# Patient Record
Sex: FEMALE | Race: WHITE | NOT HISPANIC OR LATINO | URBAN - METROPOLITAN AREA
[De-identification: names, ages, dates, MRNs, and addresses within clinical notes are randomized per-mention and may not be internally consistent; named-entity substitution may affect disease eponyms.]

---

## 2020-02-14 ENCOUNTER — OUTPATIENT (OUTPATIENT)
Dept: OUTPATIENT SERVICES | Facility: HOSPITAL | Age: 60
LOS: 1 days | End: 2020-02-14

## 2020-07-07 ENCOUNTER — OUTPATIENT (OUTPATIENT)
Dept: OUTPATIENT SERVICES | Facility: HOSPITAL | Age: 60
LOS: 1 days | End: 2020-07-07

## 2021-04-21 ENCOUNTER — APPOINTMENT (OUTPATIENT)
Age: 61
End: 2021-04-21

## 2021-09-01 ENCOUNTER — APPOINTMENT (OUTPATIENT)
Dept: OPHTHALMOLOGY | Facility: CLINIC | Age: 61
End: 2021-09-01
Payer: MEDICARE

## 2021-09-01 ENCOUNTER — NON-APPOINTMENT (OUTPATIENT)
Age: 61
End: 2021-09-01

## 2021-09-01 PROCEDURE — 92014 COMPRE OPH EXAM EST PT 1/>: CPT

## 2021-09-02 ENCOUNTER — RESULT REVIEW (OUTPATIENT)
Age: 61
End: 2021-09-02

## 2021-10-15 PROBLEM — Z00.00 ENCOUNTER FOR PREVENTIVE HEALTH EXAMINATION: Status: ACTIVE | Noted: 2021-10-15

## 2021-12-06 ENCOUNTER — TRANSCRIPTION ENCOUNTER (OUTPATIENT)
Age: 61
End: 2021-12-06

## 2021-12-06 ENCOUNTER — OUTPATIENT (OUTPATIENT)
Dept: OUTPATIENT SERVICES | Facility: HOSPITAL | Age: 61
LOS: 1 days | End: 2021-12-06

## 2022-05-04 VITALS — BODY MASS INDEX: 31.89 KG/M2 | WEIGHT: 180 LBS | HEIGHT: 63 IN

## 2022-05-05 ENCOUNTER — APPOINTMENT (OUTPATIENT)
Dept: ORTHOPEDIC SURGERY | Facility: CLINIC | Age: 62
End: 2022-05-05
Payer: OTHER MISCELLANEOUS

## 2022-05-05 VITALS — HEIGHT: 63 IN | WEIGHT: 180 LBS | BODY MASS INDEX: 31.89 KG/M2

## 2022-05-05 DIAGNOSIS — Z87.891 PERSONAL HISTORY OF NICOTINE DEPENDENCE: ICD-10-CM

## 2022-05-05 DIAGNOSIS — Z86.59 PERSONAL HISTORY OF OTHER MENTAL AND BEHAVIORAL DISORDERS: ICD-10-CM

## 2022-05-05 DIAGNOSIS — Z78.9 OTHER SPECIFIED HEALTH STATUS: ICD-10-CM

## 2022-05-05 DIAGNOSIS — S92.424D NONDISPLACED FRACTURE OF DISTAL PHALANX OF RIGHT GREAT TOE, SUBSEQUENT ENCOUNTER FOR FRACTURE WITH ROUTINE HEALING: ICD-10-CM

## 2022-05-05 DIAGNOSIS — M77.8 OTHER ENTHESOPATHIES, NOT ELSEWHERE CLASSIFIED: ICD-10-CM

## 2022-05-05 DIAGNOSIS — S92.423A DISPLACED FRACTURE OF DISTAL PHALANX OF UNSPECIFIED GREAT TOE, INITIAL ENCOUNTER FOR CLOSED FRACTURE: ICD-10-CM

## 2022-05-05 DIAGNOSIS — Z82.49 FAMILY HISTORY OF ISCHEMIC HEART DISEASE AND OTHER DISEASES OF THE CIRCULATORY SYSTEM: ICD-10-CM

## 2022-05-05 PROCEDURE — 20610 DRAIN/INJ JOINT/BURSA W/O US: CPT | Mod: LT

## 2022-05-05 PROCEDURE — 99215 OFFICE O/P EST HI 40 MIN: CPT | Mod: 25

## 2022-05-05 PROCEDURE — 99072 ADDL SUPL MATRL&STAF TM PHE: CPT

## 2022-05-05 RX ORDER — BUPROPION HYDROCHLORIDE 300 MG/1
300 TABLET, EXTENDED RELEASE ORAL
Refills: 0 | Status: ACTIVE | COMMUNITY

## 2022-05-05 RX ORDER — HYDROMORPHONE HYDROCHLORIDE 2 MG/1
2 TABLET ORAL
Refills: 0 | Status: ACTIVE | COMMUNITY

## 2022-05-05 RX ORDER — DIAZEPAM 2 MG/1
2 TABLET ORAL
Refills: 0 | Status: ACTIVE | COMMUNITY

## 2022-05-05 RX ORDER — DICLOFENAC SODIUM 1 %
1 KIT TOPICAL
Refills: 0 | Status: ACTIVE | COMMUNITY

## 2022-05-05 RX ORDER — METHYLPREDNISOLONE 4 MG/1
4 TABLET ORAL
Refills: 0 | Status: ACTIVE | COMMUNITY

## 2022-05-05 RX ORDER — LEVOCETIRIZINE DIHYDROCHLORIDE 5 MG/1
5 TABLET ORAL
Refills: 0 | Status: ACTIVE | COMMUNITY

## 2022-05-05 RX ORDER — IBUPROFEN 800 MG/1
800 TABLET, FILM COATED ORAL
Refills: 0 | Status: ACTIVE | COMMUNITY

## 2022-05-05 RX ORDER — FLUTICASONE PROPIONATE 50 UG/1
50 SPRAY, METERED NASAL
Refills: 0 | Status: ACTIVE | COMMUNITY

## 2022-05-05 RX ORDER — CYCLOBENZAPRINE HYDROCHLORIDE 10 MG/1
10 TABLET, FILM COATED ORAL
Refills: 0 | Status: ACTIVE | COMMUNITY

## 2022-05-05 RX ORDER — OXYCODONE AND ASPIRIN 4.5; .38; 325 MG/1; MG/1; MG/1
4.835-325 TABLET ORAL
Refills: 0 | Status: ACTIVE | COMMUNITY

## 2022-05-05 RX ORDER — OXYCODONE AND ACETAMINOPHEN 10; 325 MG/1; MG/1
10-325 TABLET ORAL
Refills: 0 | Status: ACTIVE | COMMUNITY

## 2022-05-05 RX ORDER — OMEPRAZOLE 40 MG/1
40 CAPSULE, DELAYED RELEASE ORAL
Refills: 0 | Status: ACTIVE | COMMUNITY

## 2022-05-05 NOTE — PROCEDURE
[Large Joint Injection] : Large joint injection [Left] : of the left [Greater Trochanteric Bursa] : greater trochanteric bursa [Pain] : pain [Inflammation] : inflammation [Betadine] : betadine [___ cc    1%] : Lidocaine ~Vcc of 1%  [___ cc    40mg] : Triamcinolone (Kenalog) ~Vcc of 40 mg  [] : Patient tolerated procedure well [Call if redness, pain or fever occur] : call if redness, pain or fever occur [Apply ice for 15min out of every hour for the next 12-24 hours as tolerated] : apply ice for 15 minutes out of every hour for the next 12-24 hours as tolerated [Patient was advised to rest the joint(s) for ____ days] : patient was advised to rest the joint(s) for [unfilled] days [Previous OTC use and PT nontherapeutic] : patient has tried OTC's including aspirin, Ibuprofen, Aleve, etc or prescription NSAIDS, and/or exercises at home and/or physical therapy without satisfactory response [Risks, benefits, alternatives discussed / Verbal consent obtained] : the risks benefits, and alternatives have been discussed, and verbal consent was obtained

## 2022-05-05 NOTE — ASSESSMENT
[FreeTextEntry1] : Worker's compensation 100% temporarily disabled \par \par Recommend: - NSAID - Heating pad - Muscle relaxer - Neck stretching exercise - Soft cervical collar - Cervical traction Patient is given neck rehabilitation exercise book.\par \par Recommend: - NSAID - Heating pad - Muscle relaxer - Core strengthening exercise - Hamstring stretching exercise Patient is given back rehabilitation exercise book.\par \par Recommend: - rest - ice - compression - elevation \par \par Continue HEP\par \par Follow up in 2 months

## 2022-05-05 NOTE — HISTORY OF PRESENT ILLNESS
[de-identified] : Follow up neck and back. Had Right RFA for C spine  on 4/11/22. Has had fewer headaches. Back pain is constant. Taking Percocet, Oxycodone, Cyclobenzaprine, Ibuprofen, and Diazepam.
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
## 2022-07-11 ENCOUNTER — OUTPATIENT (OUTPATIENT)
Dept: OUTPATIENT SERVICES | Facility: HOSPITAL | Age: 62
LOS: 1 days | End: 2022-07-11

## 2022-07-11 DIAGNOSIS — Z01.810 ENCOUNTER FOR PREPROCEDURAL CARDIOVASCULAR EXAMINATION: ICD-10-CM

## 2022-07-11 DIAGNOSIS — Z01.812 ENCOUNTER FOR PREPROCEDURAL LABORATORY EXAMINATION: ICD-10-CM

## 2022-07-11 DIAGNOSIS — K43.6 OTHER AND UNSPECIFIED VENTRAL HERNIA WITH OBSTRUCTION, WITHOUT GANGRENE: ICD-10-CM

## 2022-07-11 PROCEDURE — 93010 ELECTROCARDIOGRAM REPORT: CPT

## 2022-07-14 ENCOUNTER — APPOINTMENT (OUTPATIENT)
Dept: ORTHOPEDIC SURGERY | Facility: CLINIC | Age: 62
End: 2022-07-14

## 2022-07-14 PROCEDURE — 99072 ADDL SUPL MATRL&STAF TM PHE: CPT

## 2022-07-14 PROCEDURE — 99215 OFFICE O/P EST HI 40 MIN: CPT

## 2022-07-14 RX ORDER — LIDOCAINE 5% 700 MG/1
5 PATCH TOPICAL
Qty: 90 | Refills: 0 | Status: ACTIVE | COMMUNITY
Start: 2022-07-11

## 2022-07-14 RX ORDER — NALOXEGOL OXALATE 12.5 MG/1
12.5 TABLET, FILM COATED ORAL
Qty: 30 | Refills: 0 | Status: ACTIVE | COMMUNITY
Start: 2022-07-12

## 2022-07-14 RX ORDER — TIZANIDINE 4 MG/1
4 TABLET ORAL
Qty: 360 | Refills: 0 | Status: ACTIVE | COMMUNITY
Start: 2022-05-12

## 2022-07-14 RX ORDER — FAMOTIDINE 40 MG/1
40 TABLET, FILM COATED ORAL
Qty: 180 | Refills: 0 | Status: ACTIVE | COMMUNITY
Start: 2021-07-15

## 2022-07-14 NOTE — HISTORY OF PRESENT ILLNESS
[de-identified] : Follow up neck and back. \par Patient reports increase in pain since last viist- reports pain in right side of lower back and frequent headaches\par Reports Taking Percocet, Cyclobenzaprine, Ibuprofen, and Diazepam.\par Pt had neck RFA x3 months ago-states can not have treatment to the neck x3 more months \par \par Pt is scheduled for hernia repair sx on Monday \par
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
## 2022-07-14 NOTE — ASSESSMENT
[FreeTextEntry1] : Patient is scheduled for hernia repair this Monday, recommend continuing HEP until patient is cleared by her surgeon. \par \par Worker's compensation 100% temporarily disabled \par \par Recommend: - NSAID - Heating pad - Muscle relaxer - Neck stretching exercise - Soft cervical collar - Cervical traction Patient is given neck rehabilitation exercise book.\par \par Recommend: - NSAID - Heating pad - Muscle relaxer - Core strengthening exercise - Hamstring stretching exercise Patient is given back rehabilitation exercise book.\par \par Recommend: - rest - ice - compression - elevation \par \par Follow up in 2 months

## 2022-07-14 NOTE — WORK
[Total] : total [Bending/Twisting] : bending/twisting [Lifting] : lifting [Reaching overhead] : reaching overhead

## 2022-07-18 ENCOUNTER — OUTPATIENT (OUTPATIENT)
Dept: OUTPATIENT SERVICES | Facility: HOSPITAL | Age: 62
LOS: 1 days | End: 2022-07-18
Payer: MEDICARE

## 2022-07-28 DIAGNOSIS — K42.0 UMBILICAL HERNIA WITH OBSTRUCTION, WITHOUT GANGRENE: ICD-10-CM

## 2022-07-28 DIAGNOSIS — K43.6 OTHER AND UNSPECIFIED VENTRAL HERNIA WITH OBSTRUCTION, WITHOUT GANGRENE: ICD-10-CM

## 2022-07-28 DIAGNOSIS — F41.9 ANXIETY DISORDER, UNSPECIFIED: ICD-10-CM

## 2022-07-28 DIAGNOSIS — K44.9 DIAPHRAGMATIC HERNIA WITHOUT OBSTRUCTION OR GANGRENE: ICD-10-CM

## 2022-07-28 DIAGNOSIS — F32.9 MAJOR DEPRESSIVE DISORDER, SINGLE EPISODE, UNSPECIFIED: ICD-10-CM

## 2022-08-11 ENCOUNTER — APPOINTMENT (OUTPATIENT)
Dept: ORTHOPEDIC SURGERY | Facility: CLINIC | Age: 62
End: 2022-08-11

## 2022-08-11 VITALS — WEIGHT: 180 LBS | BODY MASS INDEX: 31.89 KG/M2 | HEIGHT: 63 IN

## 2022-08-11 PROCEDURE — 99214 OFFICE O/P EST MOD 30 MIN: CPT | Mod: 25

## 2022-08-11 PROCEDURE — 20610 DRAIN/INJ JOINT/BURSA W/O US: CPT | Mod: RT

## 2022-08-11 NOTE — PHYSICAL EXAM
[Bilateral] : hip bilaterally [] : greater trochanteric tenderness [5___] : adduction 5[unfilled]/5 [2+] : posterior tibialis pulse: 2+ [de-identified] : external rotation 40 degrees [TWNoteComboBox6] : internal rotation 30 degrees

## 2022-08-11 NOTE — ASSESSMENT
[FreeTextEntry1] : Recommend: - rest - ice - compression - elevation \par \par Follow up in 2 weeks
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
## 2022-08-26 ENCOUNTER — APPOINTMENT (OUTPATIENT)
Dept: ORTHOPEDIC SURGERY | Facility: CLINIC | Age: 62
End: 2022-08-26

## 2022-08-26 VITALS — WEIGHT: 180 LBS | BODY MASS INDEX: 31.89 KG/M2 | HEIGHT: 63 IN

## 2022-08-26 PROCEDURE — 99214 OFFICE O/P EST MOD 30 MIN: CPT | Mod: 25

## 2022-08-26 PROCEDURE — 20610 DRAIN/INJ JOINT/BURSA W/O US: CPT | Mod: LT

## 2022-08-26 NOTE — HISTORY OF PRESENT ILLNESS
[Sudden] : sudden [10] : 10 [Sharp] : sharp [Intermittent] : intermittent [Nothing helps with pain getting better] : Nothing helps with pain getting better [de-identified] : Follow up bilateral hips. Feels relief from right hip CSI. Pain is 8/10. Experiencing pain when sleeping and laying on her sides. Taking Percocet, Cyclobenzaprine, Ibuprofen, and Diazepam. Here for left hip CSI. [] : no [FreeTextEntry1] : bilateral hips [FreeTextEntry7] : down right leg [de-identified] : movement

## 2022-08-26 NOTE — PHYSICAL EXAM
[Bilateral] : hip bilaterally [5___] : adduction 5[unfilled]/5 [2+] : posterior tibialis pulse: 2+ [] : no palpable masses [de-identified] : external rotation 40 degrees [TWNoteComboBox6] : internal rotation 30 degrees

## 2022-08-26 NOTE — PROCEDURE
[Large Joint Injection] : Large joint injection [Left] : of the left [Greater Trochanteric Bursa] : greater trochanteric bursa [Pain] : pain [Inflammation] : inflammation [Betadine] : betadine [Ethyl Chloride sprayed topically] : ethyl chloride sprayed topically [Sterile technique used] : sterile technique used [___ cc    1%] : Lidocaine ~Vcc of 1%  [___ cc    40mg] : Triamcinolone (Kenalog) ~Vcc of 40 mg  [] : Patient tolerated procedure well [Call if redness, pain or fever occur] : call if redness, pain or fever occur [Apply ice for 15min out of every hour for the next 12-24 hours as tolerated] : apply ice for 15 minutes out of every hour for the next 12-24 hours as tolerated [Patient was advised to rest the joint(s) for ____ days] : patient was advised to rest the joint(s) for [unfilled] days [Previous OTC use and PT nontherapeutic] : patient has tried OTC's including aspirin, Ibuprofen, Aleve, etc or prescription NSAIDS, and/or exercises at home and/or physical therapy without satisfactory response [Patient had decreased mobility in the joint] : patient had decreased mobility in the joint [Risks, benefits, alternatives discussed / Verbal consent obtained] : the risks benefits, and alternatives have been discussed, and verbal consent was obtained

## 2022-09-15 ENCOUNTER — APPOINTMENT (OUTPATIENT)
Dept: ORTHOPEDIC SURGERY | Facility: CLINIC | Age: 62
End: 2022-09-15

## 2022-09-15 VITALS — BODY MASS INDEX: 31.89 KG/M2 | HEIGHT: 63 IN | WEIGHT: 180 LBS

## 2022-09-15 PROCEDURE — 99215 OFFICE O/P EST HI 40 MIN: CPT

## 2022-09-15 PROCEDURE — 99072 ADDL SUPL MATRL&STAF TM PHE: CPT

## 2022-09-15 NOTE — ASSESSMENT
[FreeTextEntry1] : Worker's compensation 100% temporarily disabled \par \par Recommend: - NSAID - Heating pad - Muscle relaxer - Neck stretching exercise - Soft cervical collar - Cervical traction Patient is given neck rehabilitation exercise book.\par \par Recommend: - NSAID - Heating pad - Muscle relaxer - Core strengthening exercise - Hamstring stretching exercise Patient is given back rehabilitation exercise book.\par \par Recommend: - rest - ice - compression - elevation \par \par Patient will begin physical therapy. \par \par Follow up in 2 months 
[FreeTextEntry1] : Worker's compensation 100% temporarily disabled \par \par Recommend: - NSAID - Heating pad - Muscle relaxer - Neck stretching exercise - Soft cervical collar - Cervical traction Patient is given neck rehabilitation exercise book.\par \par Recommend: - NSAID - Heating pad - Muscle relaxer - Core strengthening exercise - Hamstring stretching exercise Patient is given back rehabilitation exercise book.\par \par Recommend: - rest - ice - compression - elevation \par \par Patient will begin physical therapy. \par \par Follow up in 2 months 
independent

## 2022-09-15 NOTE — HISTORY OF PRESENT ILLNESS
[de-identified] : Follow up cervical and lumbar spine. Experiencing intermittent headaches. Experiencing an increase in pain in lower back radiating down right leg to the foot. Taking Percocet, Cyclobenzaprine, Ibuprofen, and Diazepam.\par

## 2022-09-15 NOTE — HISTORY OF PRESENT ILLNESS
[de-identified] : Follow up cervical and lumbar spine. Experiencing intermittent headaches. Experiencing an increase in pain in lower back radiating down right leg to the foot. Taking Percocet, Cyclobenzaprine, Ibuprofen, and Diazepam.\par

## 2022-09-21 ENCOUNTER — FORM ENCOUNTER (OUTPATIENT)
Age: 62
End: 2022-09-21

## 2022-11-16 ENCOUNTER — APPOINTMENT (OUTPATIENT)
Dept: OPHTHALMOLOGY | Facility: CLINIC | Age: 62
End: 2022-11-16

## 2022-11-16 ENCOUNTER — NON-APPOINTMENT (OUTPATIENT)
Age: 62
End: 2022-11-16

## 2022-11-16 PROCEDURE — 92014 COMPRE OPH EXAM EST PT 1/>: CPT

## 2022-11-17 ENCOUNTER — APPOINTMENT (OUTPATIENT)
Dept: ORTHOPEDIC SURGERY | Facility: CLINIC | Age: 62
End: 2022-11-17

## 2022-11-17 VITALS — HEIGHT: 63 IN | BODY MASS INDEX: 31.89 KG/M2 | WEIGHT: 180 LBS

## 2022-11-17 PROCEDURE — 99072 ADDL SUPL MATRL&STAF TM PHE: CPT

## 2022-11-17 PROCEDURE — 99215 OFFICE O/P EST HI 40 MIN: CPT

## 2022-11-17 NOTE — ASSESSMENT
[FreeTextEntry1] : Worker's compensation 100% temporarily disabled \par \par Recommend: - NSAID - Heating pad - Muscle relaxer - Neck stretching exercise - Soft cervical collar - Cervical traction Patient is given neck rehabilitation exercise book.\par \par Recommend: - NSAID - Heating pad - Muscle relaxer - Core strengthening exercise - Hamstring stretching exercise Patient is given back rehabilitation exercise book.\par \par Recommend: - rest - ice - compression - elevation \par \par Patient will continue physical therapy. \par \par Patient given prescription for MRI, follow up after study is completed to discuss results.\par \par Follow up in 2 months

## 2022-11-17 NOTE — HISTORY OF PRESENT ILLNESS
[de-identified] : Follow up cervical and lumbar spine. Experiencing "locking" in the neck when turning the head to the side. Has had a headache for 2 weeks. Experiencing a flare up in her neck and lower back down her right leg to her foot. WC has denied PT. Taking Percocet, Cyclobenzaprine, Ibuprofen, and Diazepam.\par

## 2022-11-20 ENCOUNTER — FORM ENCOUNTER (OUTPATIENT)
Age: 62
End: 2022-11-20

## 2022-12-01 ENCOUNTER — FORM ENCOUNTER (OUTPATIENT)
Age: 62
End: 2022-12-01

## 2022-12-07 ENCOUNTER — RESULT REVIEW (OUTPATIENT)
Age: 62
End: 2022-12-07

## 2023-01-26 ENCOUNTER — APPOINTMENT (OUTPATIENT)
Dept: ORTHOPEDIC SURGERY | Facility: CLINIC | Age: 63
End: 2023-01-26
Payer: OTHER MISCELLANEOUS

## 2023-01-26 VITALS — BODY MASS INDEX: 31.89 KG/M2 | HEIGHT: 63 IN | WEIGHT: 180 LBS

## 2023-01-26 PROCEDURE — 99215 OFFICE O/P EST HI 40 MIN: CPT

## 2023-01-26 PROCEDURE — 99072 ADDL SUPL MATRL&STAF TM PHE: CPT

## 2023-01-26 NOTE — ASSESSMENT
[FreeTextEntry1] : Stenosis C3-5, progressed since last MRI in 2021\par DDD C3-7\par \par Worker's compensation 100% temporarily disabled \par \par Recommend: - NSAID - Heating pad - Muscle relaxer - Neck stretching exercise - Soft cervical collar - Cervical traction Patient is given neck rehabilitation exercise book.\par \par Recommend: - NSAID - Heating pad - Muscle relaxer - Core strengthening exercise - Hamstring stretching exercise Patient is given back rehabilitation exercise book.\par \par Recommend: - rest - ice - compression - elevation \par \par Patient will continue physical therapy. \par \par Referral to pain management for injections, follow up 2 weeks after injection. \par \par Follow up in 2 months

## 2023-01-26 NOTE — DATA REVIEWED
[MRI] : MRI [Cervical Spine] : cervical spine [Report was reviewed and noted in the chart] : The report was reviewed and noted in the chart [I independently reviewed and interpreted images and report] : I independently reviewed and interpreted images and report [FreeTextEntry1] : Stenosis C3-5, progressed since last MRI in 2021\par DDD C3-7

## 2023-01-26 NOTE — HISTORY OF PRESENT ILLNESS
[de-identified] : Follow up cervical spine MRI Results at [facility]. Experiencing constant pain and tightness. WC has denied PT. Taking Percocet, Cyclobenzaprine, Ibuprofen, and Diazepam.\par

## 2023-02-05 ENCOUNTER — FORM ENCOUNTER (OUTPATIENT)
Age: 63
End: 2023-02-05

## 2023-03-23 ENCOUNTER — APPOINTMENT (OUTPATIENT)
Dept: ORTHOPEDIC SURGERY | Facility: CLINIC | Age: 63
End: 2023-03-23
Payer: OTHER MISCELLANEOUS

## 2023-03-23 VITALS — BODY MASS INDEX: 31.89 KG/M2 | WEIGHT: 180 LBS | HEIGHT: 63 IN

## 2023-03-23 PROCEDURE — 99215 OFFICE O/P EST HI 40 MIN: CPT | Mod: ACP

## 2023-03-23 NOTE — HISTORY OF PRESENT ILLNESS
[de-identified] : Follow up cervical spine. States her pain is radiating into the left shoulder into the bicep, has numbness in fingers. WC has denied PT. Admits to taking Percocet, Cyclobenzaprine, Ibuprofen, and Diazepam for pain.\par

## 2023-03-23 NOTE — ASSESSMENT
[FreeTextEntry1] : Stenosis C3-5, progressed since last MRI in 2021\par DDD C3-7\par \par Worker's compensation 100% temporarily disabled \par \par Recommend: - NSAID - Heating pad - Muscle relaxer - Neck stretching exercise - Soft cervical collar - Cervical traction Patient is given neck rehabilitation exercise book.\par \par Recommend: - NSAID - Heating pad - Muscle relaxer - Core strengthening exercise - Hamstring stretching exercise Patient is given back rehabilitation exercise book.\par \par Recommend: - rest - ice - compression - elevation \par \par Patient will continue physical therapy. \par \par Patient to use Voltaren.\par \par Referred to neurology for migraines.\par \par Follow up in 2 months

## 2023-03-23 NOTE — WORK
[Bending/Twisting] : bending/twisting [Lifting] : lifting [Reaching overhead] : reaching overhead [Total (100%)] : total (100%)

## 2023-05-11 ENCOUNTER — FORM ENCOUNTER (OUTPATIENT)
Age: 63
End: 2023-05-11

## 2023-05-12 ENCOUNTER — RX RENEWAL (OUTPATIENT)
Age: 63
End: 2023-05-12

## 2023-05-25 ENCOUNTER — APPOINTMENT (OUTPATIENT)
Dept: ORTHOPEDIC SURGERY | Facility: CLINIC | Age: 63
End: 2023-05-25
Payer: OTHER MISCELLANEOUS

## 2023-05-25 VITALS — BODY MASS INDEX: 31.89 KG/M2 | HEIGHT: 63 IN | WEIGHT: 180 LBS

## 2023-05-25 PROCEDURE — 99215 OFFICE O/P EST HI 40 MIN: CPT

## 2023-05-25 NOTE — WORK
[Total (100%)] : total (100%) [Bending/Twisting] : bending/twisting [Lifting] : lifting [Reaching overhead] : reaching overhead

## 2023-05-26 NOTE — HISTORY OF PRESENT ILLNESS
[de-identified] : Follow up cervical and lumbar spine. Had cervical ablation ~3 weeks ago with 0% relief. States her neck feels "weird" ever since ablation. States her lower back has constant pain. Admits to taking Percocet, Cyclobenzaprine, Ibuprofen, and Diazepam for pain.\par

## 2023-05-26 NOTE — ASSESSMENT
[FreeTextEntry1] : \par \par Stenosis C3-5, progressed since last MRI in 2021\par DDD C3-7\par \par Patient with persistent symptoms refractory to non operative care. Patient is seeking surgical options. Patient is a candidate for surgery after failing extensive non operative care.\par \par Worker's compensation 100% temporarily disabled \par \par Recommend: - NSAID - Heating pad - Muscle relaxer - Neck stretching exercise - Soft cervical collar - Cervical traction Patient is given neck rehabilitation exercise book.\par \par Recommend: - NSAID - Heating pad - Muscle relaxer - Core strengthening exercise - Hamstring stretching exercise Patient is given back rehabilitation exercise book.\par \par Recommend: - rest - ice - compression - elevation \par \par Patient will continue physical therapy. \par \par Patient to use Voltaren.\par \par Referred to neurology for migraines.\par \par Follow up in 2 months

## 2023-05-26 NOTE — DISCUSSION/SUMMARY
[Surgical risks reviewed] : Surgical risks reviewed [de-identified] : I discussed non operative and operative treatment options in great detail with the patient. I discussed treatment options, including but not limited to,\par 1. Non operative treatment - rest, NSAID, physical therapy etc.\par 2. Interventional treatment - injections etc.\par 3. Surgical treatment -  ACDF C3-4, 4-5, 5-6, autograft, allograft. \par \par Patient wants to proceed with surgical intervention after failing nonoperative care. I had a long discussion with the patient about the rational and goal of the surgery as well as expected outcome. I explained postoperative rehabilitation and recovery process to the patient. I discussed risks, benefits and alternatives of the procedure in detail with the patient. I counseled patient about risks and possible complications, including but not limited to, infection, bleeding, nerve injury, arterial and venous injury, single or multiple muscle group weakness, dural tear, CSF leak, pseudomeningocele, arachnoiditis, CSF fistula, meningitis, discitis, osteomyelitis, esophageal tear, dysphagia, hoarseness, Wolf's syndrome, thoracic duct injury, epidural hematoma, DVT, PE, CRPS, MI, paralysis, pseudoarthrosis, instrumentation malposition, adjacent segment disease and risks of anesthesia. I explained to the patient that the surgical outcome is unpredictable and there is no guarantee that the symptoms will resolve after the surgery. The patient understands and wishes to proceed. All questions were answered and patient was given information to review.\par

## 2023-06-06 ENCOUNTER — FORM ENCOUNTER (OUTPATIENT)
Age: 63
End: 2023-06-06

## 2023-06-08 ENCOUNTER — APPOINTMENT (OUTPATIENT)
Dept: ORTHOPEDIC SURGERY | Facility: CLINIC | Age: 63
End: 2023-06-08
Payer: MEDICARE

## 2023-06-08 VITALS — BODY MASS INDEX: 31.89 KG/M2 | WEIGHT: 180 LBS | HEIGHT: 63 IN

## 2023-06-08 PROCEDURE — 73503 X-RAY EXAM HIP UNI 4/> VIEWS: CPT | Mod: FY,RT

## 2023-06-08 PROCEDURE — 99214 OFFICE O/P EST MOD 30 MIN: CPT | Mod: 25

## 2023-06-08 PROCEDURE — 20610 DRAIN/INJ JOINT/BURSA W/O US: CPT | Mod: RT

## 2023-06-08 NOTE — PROCEDURE
[Large Joint Injection] : Large joint injection [Right] : of the right [Greater Trochanteric Bursa] : greater trochanteric bursa [Pain] : pain [Inflammation] : inflammation [Betadine] : betadine [Ethyl Chloride sprayed topically] : ethyl chloride sprayed topically [Sterile technique used] : sterile technique used [___ cc    1%] : Lidocaine ~Vcc of 1%  [___ cc    40mg] : Triamcinolone (Kenalog) ~Vcc of 40 mg  [] : Patient tolerated procedure well [Call if redness, pain or fever occur] : call if redness, pain or fever occur [Apply ice for 15min out of every hour for the next 12-24 hours as tolerated] : apply ice for 15 minutes out of every hour for the next 12-24 hours as tolerated [Patient was advised to rest the joint(s) for ____ days] : patient was advised to rest the joint(s) for [unfilled] days [Previous OTC use and PT nontherapeutic] : patient has tried OTC's including aspirin, Ibuprofen, Aleve, etc or prescription NSAIDS, and/or exercises at home and/or physical therapy without satisfactory response [Patient had decreased mobility in the joint] : patient had decreased mobility in the joint [Risks, benefits, alternatives discussed / Verbal consent obtained] : the risks benefits, and alternatives have been discussed, and verbal consent was obtained

## 2023-06-08 NOTE — PHYSICAL EXAM
[Bilateral] : hip bilaterally [5___] : adduction 5[unfilled]/5 [2+] : posterior tibialis pulse: 2+ [] : no palpable masses [de-identified] : external rotation 40 degrees [TWNoteComboBox6] : internal rotation 30 degrees

## 2023-06-08 NOTE — IMAGING
[Bilateral] : hip with pelvis bilaterally [AP] : anteroposterior [Lateral] : lateral [Mild arthritis (Tonnis Grade 1)] : Mild arthritis (Tonnis Grade 1)

## 2023-06-08 NOTE — HISTORY OF PRESENT ILLNESS
[Sudden] : sudden [10] : 10 [Sharp] : sharp [Intermittent] : intermittent [Nothing helps with pain getting better] : Nothing helps with pain getting better [de-identified] : Follow up right hip. States the pain wakes her up at night. Admits to taking Percocet, Cyclobenzaprine, Ibuprofen, and Diazepam. States she is here for a right hip CSI. [] : no [FreeTextEntry1] : bilateral hips [FreeTextEntry7] : down right leg [de-identified] : movement

## 2023-07-05 ENCOUNTER — RX RENEWAL (OUTPATIENT)
Age: 63
End: 2023-07-05

## 2023-07-05 RX ORDER — DICLOFENAC SODIUM 1% 10 MG/G
1 GEL TOPICAL 4 TIMES DAILY
Qty: 100 | Refills: 1 | Status: ACTIVE | COMMUNITY
Start: 2023-03-23 | End: 1900-01-01

## 2023-07-10 ENCOUNTER — FORM ENCOUNTER (OUTPATIENT)
Age: 63
End: 2023-07-10

## 2023-07-27 ENCOUNTER — APPOINTMENT (OUTPATIENT)
Dept: ORTHOPEDIC SURGERY | Facility: CLINIC | Age: 63
End: 2023-07-27
Payer: OTHER MISCELLANEOUS

## 2023-07-27 VITALS — BODY MASS INDEX: 31.89 KG/M2 | WEIGHT: 180 LBS | HEIGHT: 63 IN

## 2023-07-27 DIAGNOSIS — M51.36 OTHER INTERVERTEBRAL DISC DEGENERATION, LUMBAR REGION: ICD-10-CM

## 2023-07-27 PROCEDURE — 20610 DRAIN/INJ JOINT/BURSA W/O US: CPT | Mod: LT

## 2023-07-27 PROCEDURE — 99215 OFFICE O/P EST HI 40 MIN: CPT | Mod: 25

## 2023-07-27 NOTE — ASSESSMENT
[FreeTextEntry1] : CT Cervical:\par Spondylolisthesis C3-4 \par DDD C3-6 \par \par MRI cervical:\par Stenosis C3-5, progressed since last MRI in 2021\par DDD C3-7\par \par Referral to pain management for injections, follow up 2 weeks after injection. \par \par Worker's compensation 100% temporarily disabled \par \par Recommend: - NSAID - Heating pad - Muscle relaxer - Neck stretching exercise - Soft cervical collar - Cervical traction Patient is given neck rehabilitation exercise book.\par \par Recommend: - NSAID - Heating pad - Muscle relaxer - Core strengthening exercise - Hamstring stretching exercise Patient is given back rehabilitation exercise book.\par \par Recommend: - rest - ice - compression - elevation \par \par Continue HEP\par \par Follow up in 2 months

## 2023-07-27 NOTE — PROCEDURE
[Large Joint Injection] : Large joint injection [Left] : of the left [Subacromial Space] : subacromial space [Pain] : pain [Inflammation] : inflammation [X-ray evidence of Osteoarthritis on this or prior visit] : x-ray evidence of Osteoarthritis on this or prior visit [Betadine] : betadine [Ethyl Chloride sprayed topically] : ethyl chloride sprayed topically [Sterile technique used] : sterile technique used [___ cc    1%] : Lidocaine ~Vcc of 1%  [___ cc    40mg] : Triamcinolone (Kenalog) ~Vcc of 40 mg  [] : Patient tolerated procedure well [Call if redness, pain or fever occur] : call if redness, pain or fever occur [Apply ice for 15min out of every hour for the next 12-24 hours as tolerated] : apply ice for 15 minutes out of every hour for the next 12-24 hours as tolerated [Patient was advised to rest the joint(s) for ____ days] : patient was advised to rest the joint(s) for [unfilled] days [Previous OTC use and PT nontherapeutic] : patient has tried OTC's including aspirin, Ibuprofen, Aleve, etc or prescription NSAIDS, and/or exercises at home and/or physical therapy without satisfactory response [Patient had decreased mobility in the joint] : patient had decreased mobility in the joint [Risks, benefits, alternatives discussed / Verbal consent obtained] : the risks benefits, and alternatives have been discussed, and verbal consent was obtained

## 2023-07-27 NOTE — DATA REVIEWED
[CT Scan] : CT scan [Cervical Spine] : cervical spine [Report was reviewed and noted in the chart] : The report was reviewed and noted in the chart [I independently reviewed and interpreted images and report] : I independently reviewed and interpreted images and report [FreeTextEntry1] : Spondylolisthesis C3-4 \par DDD C3-6

## 2023-07-27 NOTE — HISTORY OF PRESENT ILLNESS
[de-identified] : Follow up cervical and lumbar spine.  denied PT. Patient states she saw Dr. Bhagat for a second opinion for her neck and was send for a cervical spine CT. Patient states she has right sided lower back pain. Patient states her right leg has been giving out. Patient admits to taking Percocet, Tizanidine, Ibuprofen, and Diazepam for pain. Patient had cervical spine CT at .\par

## 2023-07-27 NOTE — HISTORY OF PRESENT ILLNESS
[de-identified] : Follow up cervical and lumbar spine.  denied PT. Patient states she saw Dr. Bhagat for a second opinion for her neck and was send for a cervical spine CT. Patient states she has right sided lower back pain. Patient states her right leg has been giving out. Patient admits to taking Percocet, Tizanidine, Ibuprofen, and Diazepam for pain. Patient had cervical spine CT at .\par

## 2023-08-10 ENCOUNTER — APPOINTMENT (OUTPATIENT)
Dept: ORTHOPEDIC SURGERY | Facility: CLINIC | Age: 63
End: 2023-08-10

## 2023-09-22 ENCOUNTER — APPOINTMENT (OUTPATIENT)
Dept: ORTHOPEDIC SURGERY | Facility: CLINIC | Age: 63
End: 2023-09-22
Payer: MEDICARE

## 2023-09-22 PROCEDURE — 99214 OFFICE O/P EST MOD 30 MIN: CPT | Mod: 25

## 2023-09-22 PROCEDURE — 20610 DRAIN/INJ JOINT/BURSA W/O US: CPT | Mod: RT

## 2023-09-28 ENCOUNTER — APPOINTMENT (OUTPATIENT)
Dept: ORTHOPEDIC SURGERY | Facility: CLINIC | Age: 63
End: 2023-09-28
Payer: OTHER MISCELLANEOUS

## 2023-09-28 PROCEDURE — 99214 OFFICE O/P EST MOD 30 MIN: CPT

## 2023-10-26 ENCOUNTER — APPOINTMENT (OUTPATIENT)
Dept: ORTHOPEDIC SURGERY | Facility: CLINIC | Age: 63
End: 2023-10-26
Payer: MEDICARE

## 2023-10-26 VITALS — BODY MASS INDEX: 31.89 KG/M2 | WEIGHT: 180 LBS | HEIGHT: 63 IN

## 2023-10-26 PROCEDURE — 20610 DRAIN/INJ JOINT/BURSA W/O US: CPT | Mod: RT

## 2023-10-26 PROCEDURE — 99214 OFFICE O/P EST MOD 30 MIN: CPT | Mod: 25

## 2023-11-29 ENCOUNTER — NON-APPOINTMENT (OUTPATIENT)
Age: 63
End: 2023-11-29

## 2023-11-29 ENCOUNTER — APPOINTMENT (OUTPATIENT)
Dept: OPHTHALMOLOGY | Facility: CLINIC | Age: 63
End: 2023-11-29
Payer: MEDICARE

## 2023-11-29 PROCEDURE — 92014 COMPRE OPH EXAM EST PT 1/>: CPT

## 2023-12-14 ENCOUNTER — APPOINTMENT (OUTPATIENT)
Dept: ORTHOPEDIC SURGERY | Facility: CLINIC | Age: 63
End: 2023-12-14
Payer: OTHER MISCELLANEOUS

## 2023-12-14 PROCEDURE — 99214 OFFICE O/P EST MOD 30 MIN: CPT
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
## 2023-12-14 NOTE — HISTORY OF PRESENT ILLNESS
[de-identified] : Follow up cervical and lumbar spine and left shoulder. Patient denies having cervical disc replacement surgery with Dr. James due to paperwork issues. Patient states she has constant neck pain. Patient states she has had a headache for the past 4 days. Patient states she is having difficulty turning her head to the right. Patient states she has constant, localized left shoulder pain. Patient states the pain in her shoulder has increased since last visit. Patient states her left arm shakes when she tries to hold objects. Patient states her lower back pain is constant, radiating into bilateral hips, groin, and down the legs, has numbness/tingling. Patient states her legs have been giving out, right leg has been worse. Patient states she has pain lifting her right leg to walk. Patient admits to taking Percocet 10mg-325mg, Ibuprofen 800mg, Tizanidine QHS for pain with some relief. Patient is using a cane for ambulation.   Today's Pain  Neck 7/10 Right Shoulder 6/10 Lower Back with walking 9/10

## 2023-12-14 NOTE — ASSESSMENT
[FreeTextEntry1] : CT Cervical: Spondylolisthesis C3-4 DDD C3-6  MRI cervical: Stenosis C3-5, progressed since last MRI in 2021 DDD C3-7  Patient presents today for follow up on her cervical spine, shoulder, and lumbar pain. Patient had issues with paperwork regarding neck surgery. However, her back pain is a bigger issue at this time. Has MRI scheduled in 1 week. Discussed with patient that after MRI, we may consider [facility] with pain management.   - Proceed with lumbar MRI as scheduled  - Recommend NSAIDs, pain medication PRN - Recommend heating pad use to decrease muscle spasm - Discussed the importance of home exercises, including but not limited to neck stretching and cervical traction, hamstring stretch and core strengthen  Patient was educated on their diagnosis today. All questions answered and patient expressed understanding.  Worker's compensation 100% temporarily disabled  Follow up in 1 month for MRI review

## 2024-01-25 ENCOUNTER — APPOINTMENT (OUTPATIENT)
Dept: ORTHOPEDIC SURGERY | Facility: CLINIC | Age: 64
End: 2024-01-25
Payer: OTHER MISCELLANEOUS

## 2024-01-25 DIAGNOSIS — M54.16 RADICULOPATHY, LUMBAR REGION: ICD-10-CM

## 2024-01-25 DIAGNOSIS — M50.220 OTHER CERVICAL DISC DISPLACEMENT, MID-CERVICAL REGION, UNSPECIFIED LEVEL: ICD-10-CM

## 2024-01-25 PROCEDURE — 99214 OFFICE O/P EST MOD 30 MIN: CPT

## 2024-02-08 ENCOUNTER — APPOINTMENT (OUTPATIENT)
Dept: ORTHOPEDIC SURGERY | Facility: CLINIC | Age: 64
End: 2024-02-08
Payer: MEDICARE

## 2024-02-08 DIAGNOSIS — M17.11 UNILATERAL PRIMARY OSTEOARTHRITIS, RIGHT KNEE: ICD-10-CM

## 2024-02-08 PROCEDURE — 99214 OFFICE O/P EST MOD 30 MIN: CPT | Mod: 25

## 2024-02-08 PROCEDURE — 73562 X-RAY EXAM OF KNEE 3: CPT | Mod: RT

## 2024-02-08 PROCEDURE — 20610 DRAIN/INJ JOINT/BURSA W/O US: CPT | Mod: RT

## 2024-02-08 NOTE — HISTORY OF PRESENT ILLNESS
[Right Leg] : right leg [Sudden] : sudden [8] : 8 [Burning] : burning [Dull/Aching] : dull/aching [Sharp] : sharp [Shooting] : shooting [Constant] : constant [Nothing helps with pain getting better] : Nothing helps with pain getting better [Sitting] : sitting [Standing] : standing [Walking] : walking [de-identified] : Patient presents today with right knee pain. Patient states her right leg keeps going out due to her back injury. Patient states her pain radiates down to her foot. Patient states the pain wakes her up at night. Patient admits to taking Gabapentin, Percocet, and Ibuprofen for her back pain. Patient had right knee MRI at  in 2022. Patient is using a cane for ambulation. [] : no [de-identified] : right knee MRI at  in 2022

## 2024-02-08 NOTE — ASSESSMENT
[FreeTextEntry1] : 64 yo female presents today for eval of her right knee pain. X-rays show DJD. Options discussed including injection, PT, and medication management. Patient would like to proceed with CSI today.   - Patient given CSI into R knee today secondary to pain and inflammation. Patient tolerated the procedure well. Advised patient to ice the area well for the next 24 hours.   - Recommend NSAIDs PRN  - Recommend rest, ice, compression, elevation - Recommend activity modification, avoid strenuous or high impact activities  Patient was educated on their diagnosis today. Emphasized the importance of gentle stretching and strengthening. Patient expressed understanding and all questions were answered today.   Follow up in 3 months
normal...

## 2024-02-08 NOTE — REASON FOR VISIT
[FreeTextEntry2] : right knee pain, patient states her right leg keeps going out due to her back injury Right knee arthroscopy, partial medial meniscectomy 2017 by SANDRO

## 2024-02-08 NOTE — PHYSICAL EXAM
[de-identified] : Constitutional: Well groomed and developed.  Respiratory: Normal, unlabored breathing. No use of accessory muscles.  Skin: Mild ecchymosis over anterior knee on R Psychiatric: Oriented to time, place, person and event. No acute distress.  Knee exam:  Gait: Mildly antalgic gait, ambulates with cane Alignment:  Effusion: None  Tenderness: TTP Medial joint line  Crepitus about the patella                     R     L AROM      Flexion      95   130 Extension    0    0 Patellar click on motion  Special testing: Patellar grind:  Apprehension:  Pancho test:  Valgus stress: negative  Varus stress : negative Lachman test:  Ant drawer:  Post drawer:  Pivot shift:   Strength: 5/5  Sensation: Intact  Pulse: PT and DP palpable bilaterally

## 2024-02-08 NOTE — IMAGING
[Right] : right knee [AP] : anteroposterior [Lateral] : lateral [[place]] : skyline [FreeTextEntry9] : moderate tricompartmental OA

## 2024-02-08 NOTE — PROCEDURE
Verbal order per  to order the following Potassium 10 mEq by mouth daily for 2 weeks then a repeat Potassium level.       Call to patient and conveyed above per verbal order. Patient is agreeable at this time. Preferred pharmacy for Potassium is [facility]. Script sent.     Patient transferred to PSR to schedule repeat potassium lab appointment.     Orders entered     [Large Joint Injection] : Large joint injection [Right] : of the right [Knee] : knee [Pain] : pain [Inflammation] : inflammation [X-ray evidence of Osteoarthritis on this or prior visit] : x-ray evidence of Osteoarthritis on this or prior visit [Betadine] : betadine [Ethyl Chloride sprayed topically] : ethyl chloride sprayed topically [Sterile technique used] : sterile technique used [___ cc    1%] : Lidocaine ~Vcc of 1%  [___ cc    40mg] : Triamcinolone (Kenalog) ~Vcc of 40 mg  [] : Patient tolerated procedure well [Call if redness, pain or fever occur] : call if redness, pain or fever occur [Apply ice for 15min out of every hour for the next 12-24 hours as tolerated] : apply ice for 15 minutes out of every hour for the next 12-24 hours as tolerated [Patient was advised to rest the joint(s) for ____ days] : patient was advised to rest the joint(s) for [unfilled] days [Previous OTC use and PT nontherapeutic] : patient has tried OTC's including aspirin, Ibuprofen, Aleve, etc or prescription NSAIDS, and/or exercises at home and/or physical therapy without satisfactory response [Patient had decreased mobility in the joint] : patient had decreased mobility in the joint [Risks, benefits, alternatives discussed / Verbal consent obtained] : the risks benefits, and alternatives have been discussed, and verbal consent was obtained

## 2024-02-29 ENCOUNTER — APPOINTMENT (OUTPATIENT)
Dept: ORTHOPEDIC SURGERY | Facility: CLINIC | Age: 64
End: 2024-02-29
Payer: MEDICARE

## 2024-02-29 PROCEDURE — 20610 DRAIN/INJ JOINT/BURSA W/O US: CPT | Mod: RT

## 2024-02-29 PROCEDURE — 99214 OFFICE O/P EST MOD 30 MIN: CPT | Mod: 25

## 2024-02-29 NOTE — ASSESSMENT
[FreeTextEntry1] : 62 yo female presents today for pain of her right hip. Patient has pain of the right trochanteric bursa, improved with CSI in 10/2023. Recommend another injection today.   - Patient given CSI into R troch bursa today secondary to pain and inflammation. Patient tolerated the procedure well. Advised patient to ice the area well for the next 24 hours.   - Recommend NSAIDs PRN  - Recommend rest, ice, compression, elevation - Recommend activity modification, avoid strenuous or high impact activities  Patient was educated on their diagnosis today. Emphasized the importance of gentle stretching and strengthening. Patient expressed understanding and all questions were answered today.     Follow up 2 months

## 2024-02-29 NOTE — PROCEDURE
[Right] : of the right [Large Joint Injection] : Large joint injection [Greater Trochanteric Bursa] : greater trochanteric bursa [Pain] : pain [Inflammation] : inflammation [X-ray evidence of Osteoarthritis on this or prior visit] : x-ray evidence of Osteoarthritis on this or prior visit [Betadine] : betadine [Ethyl Chloride sprayed topically] : ethyl chloride sprayed topically [Sterile technique used] : sterile technique used [___ cc    1%] : Lidocaine ~Vcc of 1%  [___ cc    40mg] : Triamcinolone (Kenalog) ~Vcc of 40 mg  [] : Patient tolerated procedure well [Call if redness, pain or fever occur] : call if redness, pain or fever occur [Apply ice for 15min out of every hour for the next 12-24 hours as tolerated] : apply ice for 15 minutes out of every hour for the next 12-24 hours as tolerated [Previous OTC use and PT nontherapeutic] : patient has tried OTC's including aspirin, Ibuprofen, Aleve, etc or prescription NSAIDS, and/or exercises at home and/or physical therapy without satisfactory response [Patient was advised to rest the joint(s) for ____ days] : patient was advised to rest the joint(s) for [unfilled] days [Risks, benefits, alternatives discussed / Verbal consent obtained] : the risks benefits, and alternatives have been discussed, and verbal consent was obtained [Patient had decreased mobility in the joint] : patient had decreased mobility in the joint

## 2024-02-29 NOTE — PHYSICAL EXAM
[Bilateral] : hip bilaterally [5___] : adduction 5[unfilled]/5 [2+] : posterior tibialis pulse: 2+ [] : no palpable masses [de-identified] : external rotation 40 degrees [TWNoteComboBox6] : internal rotation 30 degrees

## 2024-02-29 NOTE — HISTORY OF PRESENT ILLNESS
[de-identified] : Follow up right hip. Patient states she felt relief from right troch bursa CSI on 9/22/23. Patient states her pain is constant. Patient states she feels increased pain rising from a sitting to standing position and climbing stairs. Patient states her right leg gives out on her. Patient admits to taking Gabapentin, Percocet, and Ibuprofen for pain. Patient is using a cane for ambulation. Patient would like CSI today.  Today's Pain 8/10
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
## 2024-03-28 ENCOUNTER — APPOINTMENT (OUTPATIENT)
Dept: ORTHOPEDIC SURGERY | Facility: CLINIC | Age: 64
End: 2024-03-28
Payer: OTHER MISCELLANEOUS

## 2024-03-28 DIAGNOSIS — M12.812 OTHER SPECIFIC ARTHROPATHIES, NOT ELSEWHERE CLASSIFIED, LEFT SHOULDER: ICD-10-CM

## 2024-03-28 PROCEDURE — 73030 X-RAY EXAM OF SHOULDER: CPT | Mod: LT

## 2024-03-28 PROCEDURE — 99214 OFFICE O/P EST MOD 30 MIN: CPT

## 2024-03-28 PROCEDURE — 72200 X-RAY EXAM SI JOINTS: CPT

## 2024-03-28 PROCEDURE — 72100 X-RAY EXAM L-S SPINE 2/3 VWS: CPT

## 2024-03-28 NOTE — HISTORY OF PRESENT ILLNESS
[de-identified] : Follow up cervical and lumbar spine and left shoulder.  Had an ablation on her neck yesterday with pain management. 5 days ago she fell backwards after her RT leg gave out landed on her shoulder and lower back. Complaining of tailbone pain when she walks.  Today's Pain  Neck 8/10 Right Shoulder 5/10 Lower Back with walking 9/10

## 2024-03-28 NOTE — ASSESSMENT
[FreeTextEntry1] : Patient presents today for follow up on her cervical spine, shoulder, and lumbar pain. Patient with recent reinjury of coccyx, and left shoulder. X-rays of L spine and coccyx negative for fractures, x-rays of left shoulder with questionable avulsion over lateral acromion and high riding humeral head. Patient with significant limitations in left shoulder ROM and strength concerning for rotator cuff tear.  -Rx MRI left shoulder w/o contrast to r/o rotator cuff tear - Recommend NSAIDs, pain medication PRN - Recommend heating pad use to decrease muscle spasm - Discussed the importance of home exercises, including but not limited to neck stretching and cervical traction, hamstring stretch and core strengthen  Patient was educated on their diagnosis today. All questions answered and patient expressed understanding.  Worker's compensation 100% temporarily disabled  Follow up after left shoulder MRI viewing

## 2024-04-29 ENCOUNTER — RESULT REVIEW (OUTPATIENT)
Age: 64
End: 2024-04-29

## 2024-05-30 ENCOUNTER — APPOINTMENT (OUTPATIENT)
Dept: ORTHOPEDIC SURGERY | Facility: CLINIC | Age: 64
End: 2024-05-30
Payer: MEDICARE

## 2024-05-30 VITALS — WEIGHT: 180 LBS | BODY MASS INDEX: 31.89 KG/M2 | HEIGHT: 63 IN

## 2024-05-30 PROCEDURE — 20610 DRAIN/INJ JOINT/BURSA W/O US: CPT | Mod: RT

## 2024-05-30 PROCEDURE — 99214 OFFICE O/P EST MOD 30 MIN: CPT | Mod: 25

## 2024-05-30 RX ORDER — METHYLPREDNISOLONE 4 MG/1
4 TABLET ORAL
Qty: 1 | Refills: 0 | Status: ACTIVE | COMMUNITY
Start: 2024-05-30 | End: 1900-01-01

## 2024-05-30 NOTE — ASSESSMENT
[FreeTextEntry1] : 62 yo female presents today for pain of her right hip. Patient has pain of the right trochanteric bursa, improved with previous CSI. I recommend proceeding with another today.   - Patient given CSI into R troch bursa today secondary to pain and inflammation. Patient tolerated the procedure well. Advised patient to ice the area well for the next 24 hours.   - Recommend NSAIDs PRN  - Recommend rest, ice, compression, elevation - Recommend activity modification, avoid strenuous or high impact activities  Patient was educated on their diagnosis today. Emphasized the importance of gentle stretching and strengthening. Patient expressed understanding and all questions were answered today.    Follow up in 2 weeks

## 2024-05-30 NOTE — PHYSICAL EXAM
[Bilateral] : hip bilaterally [5___] : adduction 5[unfilled]/5 [2+] : posterior tibialis pulse: 2+ [] : no palpable masses [de-identified] : external rotation 40 degrees [TWNoteComboBox6] : internal rotation 30 degrees

## 2024-05-30 NOTE — HISTORY OF PRESENT ILLNESS
[de-identified] : Follow up right hip. Patient states she felt relief from right troch bursa CSI on 2/29/24. Patient states she is having a lot of pain with walking, ambulating with the cane. Difficulty going on her daily walks.  Patient admits to taking Gabapentin, Percocet, and Ibuprofen for pain with little relief.  Today's Pain 9/10 with walking.

## 2024-06-13 ENCOUNTER — APPOINTMENT (OUTPATIENT)
Dept: ORTHOPEDIC SURGERY | Facility: CLINIC | Age: 64
End: 2024-06-13
Payer: MEDICARE

## 2024-06-13 DIAGNOSIS — M70.62 TROCHANTERIC BURSITIS, LEFT HIP: ICD-10-CM

## 2024-06-13 DIAGNOSIS — M70.61 TROCHANTERIC BURSITIS, RIGHT HIP: ICD-10-CM

## 2024-06-13 PROCEDURE — 20610 DRAIN/INJ JOINT/BURSA W/O US: CPT | Mod: LT

## 2024-06-13 PROCEDURE — 99214 OFFICE O/P EST MOD 30 MIN: CPT | Mod: 25

## 2024-06-13 NOTE — PHYSICAL EXAM
[Bilateral] : hip bilaterally [5___] : adduction 5[unfilled]/5 [2+] : posterior tibialis pulse: 2+ [] : no palpable masses [de-identified] : external rotation 40 degrees [TWNoteComboBox6] : internal rotation 30 degrees

## 2024-06-13 NOTE — ASSESSMENT
[FreeTextEntry1] : 62 yo female presents today for pain of her left hip. Patient with minimal improvement following right sided injection. However, she would like to proceed with left sided injection at this time for relief.   - Patient given CSI into L troch bursa today secondary to pain and inflammation. Patient tolerated the procedure well. Advised patient to ice the area well for the next 24 hours.   - Recommend NSAIDs PRN  - Recommend rest, ice, compression, elevation - Recommend activity modification, avoid strenuous or high impact activities  Patient was educated on their diagnosis today. Emphasized the importance of gentle stretching and strengthening. Patient expressed understanding and all questions were answered today.    Follow up in 2 months

## 2024-06-13 NOTE — HISTORY OF PRESENT ILLNESS
[de-identified] : Follow up left hip. Patient states her pain radiates into the groin and upper thigh. Patient admits to taking Gabapentin, Percocet, and Ibuprofen for pain with little relief.  Today's Pain 7/10

## 2024-06-13 NOTE — PROCEDURE
[Large Joint Injection] : Large joint injection [Greater Trochanteric Bursa] : greater trochanteric bursa [Pain] : pain [Inflammation] : inflammation [X-ray evidence of Osteoarthritis on this or prior visit] : x-ray evidence of Osteoarthritis on this or prior visit [Betadine] : betadine [Ethyl Chloride sprayed topically] : ethyl chloride sprayed topically [Sterile technique used] : sterile technique used [___ cc    1%] : Lidocaine ~Vcc of 1%  [___ cc    40mg] : Triamcinolone (Kenalog) ~Vcc of 40 mg  [] : Patient tolerated procedure well [Call if redness, pain or fever occur] : call if redness, pain or fever occur [Apply ice for 15min out of every hour for the next 12-24 hours as tolerated] : apply ice for 15 minutes out of every hour for the next 12-24 hours as tolerated [Patient was advised to rest the joint(s) for ____ days] : patient was advised to rest the joint(s) for [unfilled] days [Previous OTC use and PT nontherapeutic] : patient has tried OTC's including aspirin, Ibuprofen, Aleve, etc or prescription NSAIDS, and/or exercises at home and/or physical therapy without satisfactory response [Patient had decreased mobility in the joint] : patient had decreased mobility in the joint [Risks, benefits, alternatives discussed / Verbal consent obtained] : the risks benefits, and alternatives have been discussed, and verbal consent was obtained [Left] : of the left

## 2024-06-20 ENCOUNTER — APPOINTMENT (OUTPATIENT)
Dept: ORTHOPEDIC SURGERY | Facility: CLINIC | Age: 64
End: 2024-06-20
Payer: OTHER MISCELLANEOUS

## 2024-06-20 DIAGNOSIS — M75.102 UNSPECIFIED ROTATOR CUFF TEAR OR RUPTURE OF LEFT SHOULDER, NOT SPECIFIED AS TRAUMATIC: ICD-10-CM

## 2024-06-20 DIAGNOSIS — M47.817 SPONDYLOSIS W/OUT MYELOPATHY OR RADICULOPATHY, LUMBOSACRAL REGION: ICD-10-CM

## 2024-06-20 DIAGNOSIS — M43.12 SPONDYLOLISTHESIS, CERVICAL REGION: ICD-10-CM

## 2024-06-20 DIAGNOSIS — M47.812 SPONDYLOSIS W/OUT MYELOPATHY OR RADICULOPATHY, CERVICAL REGION: ICD-10-CM

## 2024-06-20 DIAGNOSIS — M54.12 RADICULOPATHY, CERVICAL REGION: ICD-10-CM

## 2024-06-20 DIAGNOSIS — M48.061 SPINAL STENOSIS, LUMBAR REGION WITHOUT NEUROGENIC CLAUDICATION: ICD-10-CM

## 2024-06-20 DIAGNOSIS — M12.812 UNSPECIFIED ROTATOR CUFF TEAR OR RUPTURE OF LEFT SHOULDER, NOT SPECIFIED AS TRAUMATIC: ICD-10-CM

## 2024-06-20 DIAGNOSIS — M50.320 OTHER CERVICAL DISC DEGENERATION, MID-CERVICAL REGION, UNSPECIFIED LEVEL: ICD-10-CM

## 2024-06-20 DIAGNOSIS — M75.42 IMPINGEMENT SYNDROME OF LEFT SHOULDER: ICD-10-CM

## 2024-06-20 DIAGNOSIS — M53.3 SACROCOCCYGEAL DISORDERS, NOT ELSEWHERE CLASSIFIED: ICD-10-CM

## 2024-06-20 DIAGNOSIS — M48.02 SPINAL STENOSIS, CERVICAL REGION: ICD-10-CM

## 2024-06-20 DIAGNOSIS — M47.22 OTHER SPONDYLOSIS WITH RADICULOPATHY, CERVICAL REGION: ICD-10-CM

## 2024-06-20 DIAGNOSIS — M51.37 OTHER INTERVERTEBRAL DISC DEGENERATION, LUMBOSACRAL REGION: ICD-10-CM

## 2024-06-20 DIAGNOSIS — S46.212A STRAIN OF MUSCLE, FASCIA AND TENDON OF OTHER PARTS OF BICEPS, LEFT ARM, INITIAL ENCOUNTER: ICD-10-CM

## 2024-06-20 DIAGNOSIS — Z98.890 OTHER SPECIFIED POSTPROCEDURAL STATES: ICD-10-CM

## 2024-06-20 PROCEDURE — 20610 DRAIN/INJ JOINT/BURSA W/O US: CPT | Mod: LT

## 2024-06-20 PROCEDURE — 99214 OFFICE O/P EST MOD 30 MIN: CPT | Mod: 25

## 2024-06-20 NOTE — HISTORY OF PRESENT ILLNESS
[de-identified] : Follow up left shoulder MRI Results at . Patient states she has constant pain. Patient admits to taking Gabapentin, Percocet, and Ibuprofen for pain with some relief.  Today's Pain 7/10

## 2024-06-20 NOTE — DATA REVIEWED
[MRI] : MRI [Left] : left [Shoulder] : shoulder [I independently reviewed and interpreted images and report] : I independently reviewed and interpreted images and report [FreeTextEntry1] : MRI of L shoulder was reviewed today and is as follows:  Status post rotator cuff tear Full thickness tear of left biceps tendon long head with retraction

## 2024-06-20 NOTE — ASSESSMENT
[FreeTextEntry1] : Patient presents today for follow up on her cervical spine, shoulder, and lumbar pain. Patient with recent reinjury of coccyx, and left shoulder. X-rays of L spine and coccyx negative for fractures, x-rays of left shoulder with questionable avulsion over lateral acromion and high riding humeral head. MRI of left shoulder reveals partial thickness tear of rotator cuff with new full thickness tear of long head of bicep. Options discussed including injection, PT, and surgery. I recommend proceeding with a combination of CSI and PT for relief.   - Patient given CSI into L shoulder today secondary to pain and inflammation. Patient tolerated the procedure well. Advised patient to ice the area well for the next 24 hours.   - Recommend physical therapy to regain range of motion, strengthening and symptomatic improvement. Prescription given in office today.   - Recommend NSAIDs, pain medication PRN - Recommend heating pad use to decrease muscle spasm - Discussed the importance of home exercises, including but not limited to neck stretching and cervical traction, hamstring stretch and core strengthen  Patient was educated on their diagnosis today. All questions answered and patient expressed understanding.  Worker's compensation 100% temporarily disabled  Follow up in 2 months

## 2024-08-01 ENCOUNTER — APPOINTMENT (OUTPATIENT)
Dept: ORTHOPEDIC SURGERY | Facility: CLINIC | Age: 64
End: 2024-08-01
Payer: MEDICARE

## 2024-08-01 DIAGNOSIS — M17.11 UNILATERAL PRIMARY OSTEOARTHRITIS, RIGHT KNEE: ICD-10-CM

## 2024-08-01 PROCEDURE — 99214 OFFICE O/P EST MOD 30 MIN: CPT | Mod: 25

## 2024-08-01 PROCEDURE — 20610 DRAIN/INJ JOINT/BURSA W/O US: CPT | Mod: RT

## 2024-08-01 NOTE — PHYSICAL EXAM
[de-identified] : Constitutional: Well groomed and developed.  Respiratory: Normal, unlabored breathing. No use of accessory muscles.  Skin: Mild ecchymosis over anterior knee on R Psychiatric: Oriented to time, place, person and event. No acute distress.  Knee exam:  Gait: Mildly antalgic gait, ambulates with cane Alignment:  Effusion: None  Tenderness: TTP Medial joint line  Crepitus about the patella                     R     L AROM      Flexion      95   130 Extension    0    0 Patellar click on motion  Special testing: Patellar grind:  Apprehension:  Pancho test:  Valgus stress: negative  Varus stress : negative Lachman test:  Ant drawer:  Post drawer:  Pivot shift:   Strength: 5/5  Sensation: Intact  Pulse: PT and DP palpable bilaterally

## 2024-08-01 NOTE — ASSESSMENT
[FreeTextEntry1] : 62 yo female presents today for eval of her right knee pain. X-rays show DJD. Patient with relief following 2/2204 CSI. Recommend another today.   - Patient given CSI into R knee today secondary to pain and inflammation. Patient tolerated the procedure well. Advised patient to ice the area well for the next 24 hours.   - Recommend NSAIDs PRN  - Recommend rest, ice, compression, elevation - Recommend activity modification, avoid strenuous or high impact activities  Patient was educated on their diagnosis today. Emphasized the importance of gentle stretching and strengthening. Patient expressed understanding and all questions were answered today.   Follow up in 3 months

## 2024-08-01 NOTE — HISTORY OF PRESENT ILLNESS
[de-identified] : Follow up right knee. Patient states she felt relief from CSI on 2/8/24 until ~1 month ago. Patient states she has constant pain radiating into her thigh. Patient states she has swelling. Patient states she has difficulty walking and climbing stairs. Patient admits to taking Gabapentin, Percocet, and Ibuprofen for her back pain. Patient is using a cane for ambulation.  Today's Pain 9/10

## 2024-08-01 NOTE — PROCEDURE
[Large Joint Injection] : Large joint injection [Right] : of the right [Knee] : knee [Pain] : pain [Inflammation] : inflammation [X-ray evidence of Osteoarthritis on this or prior visit] : x-ray evidence of Osteoarthritis on this or prior visit [Betadine] : betadine [Ethyl Chloride sprayed topically] : ethyl chloride sprayed topically [Sterile technique used] : sterile technique used [___ cc    1%] : Lidocaine ~Vcc of 1%  [___ cc    40mg] : Triamcinolone (Kenalog) ~Vcc of 40 mg  [] : Patient tolerated procedure well [Call if redness, pain or fever occur] : call if redness, pain or fever occur [Apply ice for 15min out of every hour for the next 12-24 hours as tolerated] : apply ice for 15 minutes out of every hour for the next 12-24 hours as tolerated [Patient was advised to rest the joint(s) for ____ days] : patient was advised to rest the joint(s) for [unfilled] days [Previous OTC use and PT nontherapeutic] : patient has tried OTC's including aspirin, Ibuprofen, Aleve, etc or prescription NSAIDS, and/or exercises at home and/or physical therapy without satisfactory response [Patient had decreased mobility in the joint] : patient had decreased mobility in the joint [Risks, benefits, alternatives discussed / Verbal consent obtained] : the risks benefits, and alternatives have been discussed, and verbal consent was obtained

## 2024-09-05 ENCOUNTER — APPOINTMENT (OUTPATIENT)
Dept: ORTHOPEDIC SURGERY | Facility: CLINIC | Age: 64
End: 2024-09-05
Payer: MEDICARE

## 2024-09-05 DIAGNOSIS — M70.61 TROCHANTERIC BURSITIS, RIGHT HIP: ICD-10-CM

## 2024-09-05 DIAGNOSIS — M70.62 TROCHANTERIC BURSITIS, LEFT HIP: ICD-10-CM

## 2024-09-05 PROCEDURE — 20610 DRAIN/INJ JOINT/BURSA W/O US: CPT | Mod: RT

## 2024-09-05 PROCEDURE — 99214 OFFICE O/P EST MOD 30 MIN: CPT | Mod: 25

## 2024-09-05 NOTE — HISTORY OF PRESENT ILLNESS
[de-identified] : Body part: bilateral hips Better/ Worse/ Same since last visit: worse Treatments since last visit: none Difficulty with: sitting, laying down Radicular symptoms: into the groin and upper thighs Paresthesia: yes Current medications for pain: Gabapentin, Percocet, and Ibuprofen Assistive walking device: cane   Today's Pain:  5/10

## 2024-09-05 NOTE — PHYSICAL EXAM
[Bilateral] : hip bilaterally [5___] : adduction 5[unfilled]/5 [2+] : posterior tibialis pulse: 2+ [] : no palpable masses [de-identified] : external rotation 40 degrees [TWNoteComboBox6] : internal rotation 30 degrees

## 2024-09-05 NOTE — ASSESSMENT
[FreeTextEntry1] : 64 yo female presents today for pain of her right hip. Good relief with CSI in 5/2024. Recommend another today.   - Patient given CSI into R troch bursa today secondary to pain and inflammation. Patient tolerated the procedure well. Advised patient to ice the area well for the next 24 hours.   - Recommend NSAIDs PRN  - Recommend rest, ice, compression, elevation - Recommend activity modification, avoid strenuous or high impact activities  Patient was educated on their diagnosis today. Emphasized the importance of gentle stretching and strengthening. Patient expressed understanding and all questions were answered today.    Follow up in 3 months

## 2024-09-12 ENCOUNTER — APPOINTMENT (OUTPATIENT)
Dept: ORTHOPEDIC SURGERY | Facility: CLINIC | Age: 64
End: 2024-09-12
Payer: OTHER MISCELLANEOUS

## 2024-09-12 DIAGNOSIS — M48.061 SPINAL STENOSIS, LUMBAR REGION WITHOUT NEUROGENIC CLAUDICATION: ICD-10-CM

## 2024-09-12 DIAGNOSIS — M48.02 SPINAL STENOSIS, CERVICAL REGION: ICD-10-CM

## 2024-09-12 DIAGNOSIS — M75.102 UNSPECIFIED ROTATOR CUFF TEAR OR RUPTURE OF LEFT SHOULDER, NOT SPECIFIED AS TRAUMATIC: ICD-10-CM

## 2024-09-12 DIAGNOSIS — M12.812 UNSPECIFIED ROTATOR CUFF TEAR OR RUPTURE OF LEFT SHOULDER, NOT SPECIFIED AS TRAUMATIC: ICD-10-CM

## 2024-09-12 PROCEDURE — 99214 OFFICE O/P EST MOD 30 MIN: CPT

## 2024-09-12 NOTE — HISTORY OF PRESENT ILLNESS
[de-identified] : Body part: left shoulder, neck and back  Better/ Worse/ Same since last visit:  Treatments since last visit: L shoulder CSI 6/2024,  C spine YLUIANA 7/1/2024 with Dr Hoffman with ~60% relief  Difficulty with: Overhead activity  Radicular symptoms: Bilat arms and across shoulder blades/ upper back  Paresthesia: Bilat arms  Current medications for pain: Gabapentin, Percocet, and Ibuprofen Assistive walking device: Cane   Current work status (if Workers Comp):  Not working

## 2024-09-12 NOTE — ASSESSMENT
[FreeTextEntry1] : Patient presents today for follow up on her cervical spine, shoulder, and lumbar pain. Patient with improvements in neck pain following YULIANA with Dr. Hoffman, 60% relief. She also had improvements following left shoulder CSI at last visit.    - Patient will continue HEP as detailed in home exercise booklet given in office. Emphasized daily stretching and strengthening.   - Recommend NSAIDs, pain medication PRN - Recommend heating pad use to decrease muscle spasm - Discussed the importance of home exercises, including but not limited to neck stretching and cervical traction, hamstring stretch and core strengthen  Patient was educated on their diagnosis today. All questions answered and patient expressed understanding.  Worker's compensation 100% temporarily disabled  Follow up in 2 months

## 2024-09-19 ENCOUNTER — APPOINTMENT (OUTPATIENT)
Dept: ORTHOPEDIC SURGERY | Facility: CLINIC | Age: 64
End: 2024-09-19
Payer: MEDICARE

## 2024-09-19 DIAGNOSIS — M70.62 TROCHANTERIC BURSITIS, LEFT HIP: ICD-10-CM

## 2024-09-19 DIAGNOSIS — M70.61 TROCHANTERIC BURSITIS, RIGHT HIP: ICD-10-CM

## 2024-09-19 PROCEDURE — 20610 DRAIN/INJ JOINT/BURSA W/O US: CPT | Mod: LT

## 2024-09-19 PROCEDURE — 99214 OFFICE O/P EST MOD 30 MIN: CPT | Mod: 25

## 2024-09-19 NOTE — HISTORY OF PRESENT ILLNESS
[de-identified] : Body part: bilateral hips Better/ Worse/ Same since last visit: better, felt relief from right troch bursa CSI on 9/5/24 Treatments since last visit: none Difficulty with: sitting, laying down Radicular symptoms: into the groin and upper thighs Paresthesia: yes Current medications for pain: Gabapentin, Percocet, and Ibuprofen Assistive walking device: none   Today's Pain:   Right Hip 4/10 Left Hip 7/10

## 2024-09-19 NOTE — ASSESSMENT
Addended by: LIBAN MARSH on: 1/3/2024 11:52 AM     Modules accepted: Orders     [FreeTextEntry1] : 62 yo female presents today for pain of her left hip. Good relief with CSI in past.. Recommend another today.   - Patient given CSI into L troch bursa today secondary to pain and inflammation. Patient tolerated the procedure well. Advised patient to ice the area well for the next 24 hours.   - Recommend NSAIDs PRN  - Recommend rest, ice, compression, elevation - Recommend activity modification, avoid strenuous or high impact activities  Patient was educated on their diagnosis today. Emphasized the importance of gentle stretching and strengthening. Patient expressed understanding and all questions were answered today.    Follow up in 3 months

## 2024-09-19 NOTE — PHYSICAL EXAM
[Bilateral] : hip bilaterally [5___] : adduction 5[unfilled]/5 [2+] : posterior tibialis pulse: 2+ [] : no palpable masses [de-identified] : external rotation 40 degrees [TWNoteComboBox6] : internal rotation 30 degrees

## 2024-09-26 ENCOUNTER — APPOINTMENT (OUTPATIENT)
Dept: ORTHOPEDIC SURGERY | Facility: CLINIC | Age: 64
End: 2024-09-26

## 2024-10-31 ENCOUNTER — APPOINTMENT (OUTPATIENT)
Dept: ORTHOPEDIC SURGERY | Facility: CLINIC | Age: 64
End: 2024-10-31

## 2024-11-14 ENCOUNTER — APPOINTMENT (OUTPATIENT)
Dept: ORTHOPEDIC SURGERY | Facility: CLINIC | Age: 64
End: 2024-11-14
Payer: OTHER MISCELLANEOUS

## 2024-11-14 DIAGNOSIS — M48.02 SPINAL STENOSIS, CERVICAL REGION: ICD-10-CM

## 2024-11-14 DIAGNOSIS — M48.061 SPINAL STENOSIS, LUMBAR REGION WITHOUT NEUROGENIC CLAUDICATION: ICD-10-CM

## 2024-11-14 DIAGNOSIS — M75.102 UNSPECIFIED ROTATOR CUFF TEAR OR RUPTURE OF LEFT SHOULDER, NOT SPECIFIED AS TRAUMATIC: ICD-10-CM

## 2024-11-14 DIAGNOSIS — Z98.890 OTHER SPECIFIED POSTPROCEDURAL STATES: ICD-10-CM

## 2024-11-14 DIAGNOSIS — M75.42 IMPINGEMENT SYNDROME OF LEFT SHOULDER: ICD-10-CM

## 2024-11-14 DIAGNOSIS — M12.812 UNSPECIFIED ROTATOR CUFF TEAR OR RUPTURE OF LEFT SHOULDER, NOT SPECIFIED AS TRAUMATIC: ICD-10-CM

## 2024-11-14 PROCEDURE — 99245 OFF/OP CONSLTJ NEW/EST HI 55: CPT

## 2024-11-20 ENCOUNTER — NON-APPOINTMENT (OUTPATIENT)
Age: 64
End: 2024-11-20

## 2024-11-20 ENCOUNTER — APPOINTMENT (OUTPATIENT)
Dept: OPHTHALMOLOGY | Facility: CLINIC | Age: 64
End: 2024-11-20
Payer: MEDICARE

## 2024-11-20 PROCEDURE — 92133 CPTRZD OPH DX IMG PST SGM ON: CPT

## 2024-11-20 PROCEDURE — 92014 COMPRE OPH EXAM EST PT 1/>: CPT

## 2024-12-12 ENCOUNTER — APPOINTMENT (OUTPATIENT)
Dept: ORTHOPEDIC SURGERY | Facility: CLINIC | Age: 64
End: 2024-12-12
Payer: MEDICARE

## 2024-12-12 DIAGNOSIS — M70.61 TROCHANTERIC BURSITIS, RIGHT HIP: ICD-10-CM

## 2024-12-12 DIAGNOSIS — M70.62 TROCHANTERIC BURSITIS, LEFT HIP: ICD-10-CM

## 2024-12-12 PROCEDURE — 20610 DRAIN/INJ JOINT/BURSA W/O US: CPT | Mod: LT

## 2024-12-12 PROCEDURE — 99214 OFFICE O/P EST MOD 30 MIN: CPT | Mod: 25

## 2025-01-09 ENCOUNTER — APPOINTMENT (OUTPATIENT)
Dept: ORTHOPEDIC SURGERY | Facility: CLINIC | Age: 65
End: 2025-01-09
Payer: MEDICARE

## 2025-01-09 DIAGNOSIS — M70.62 TROCHANTERIC BURSITIS, LEFT HIP: ICD-10-CM

## 2025-01-09 DIAGNOSIS — M70.61 TROCHANTERIC BURSITIS, RIGHT HIP: ICD-10-CM

## 2025-01-09 PROCEDURE — 20610 DRAIN/INJ JOINT/BURSA W/O US: CPT | Mod: LT

## 2025-01-09 PROCEDURE — 99214 OFFICE O/P EST MOD 30 MIN: CPT | Mod: 25

## 2025-01-23 ENCOUNTER — APPOINTMENT (OUTPATIENT)
Dept: ORTHOPEDIC SURGERY | Facility: CLINIC | Age: 65
End: 2025-01-23
Payer: OTHER MISCELLANEOUS

## 2025-01-23 DIAGNOSIS — M48.02 SPINAL STENOSIS, CERVICAL REGION: ICD-10-CM

## 2025-01-23 DIAGNOSIS — M48.061 SPINAL STENOSIS, LUMBAR REGION WITHOUT NEUROGENIC CLAUDICATION: ICD-10-CM

## 2025-01-23 PROCEDURE — 99214 OFFICE O/P EST MOD 30 MIN: CPT

## 2025-03-13 ENCOUNTER — APPOINTMENT (OUTPATIENT)
Dept: ORTHOPEDIC SURGERY | Facility: CLINIC | Age: 65
End: 2025-03-13
Payer: MEDICARE

## 2025-03-13 DIAGNOSIS — M70.62 TROCHANTERIC BURSITIS, LEFT HIP: ICD-10-CM

## 2025-03-13 DIAGNOSIS — M70.61 TROCHANTERIC BURSITIS, RIGHT HIP: ICD-10-CM

## 2025-03-13 PROCEDURE — 20610 DRAIN/INJ JOINT/BURSA W/O US: CPT | Mod: RT

## 2025-03-13 PROCEDURE — 99214 OFFICE O/P EST MOD 30 MIN: CPT | Mod: 25

## 2025-03-27 ENCOUNTER — APPOINTMENT (OUTPATIENT)
Dept: ORTHOPEDIC SURGERY | Facility: CLINIC | Age: 65
End: 2025-03-27
Payer: OTHER MISCELLANEOUS

## 2025-03-27 DIAGNOSIS — M47.812 SPONDYLOSIS W/OUT MYELOPATHY OR RADICULOPATHY, CERVICAL REGION: ICD-10-CM

## 2025-03-27 DIAGNOSIS — M54.16 RADICULOPATHY, LUMBAR REGION: ICD-10-CM

## 2025-03-27 DIAGNOSIS — M48.061 SPINAL STENOSIS, LUMBAR REGION WITHOUT NEUROGENIC CLAUDICATION: ICD-10-CM

## 2025-03-27 DIAGNOSIS — M48.02 SPINAL STENOSIS, CERVICAL REGION: ICD-10-CM

## 2025-03-27 DIAGNOSIS — M50.320 OTHER CERVICAL DISC DEGENERATION, MID-CERVICAL REGION, UNSPECIFIED LEVEL: ICD-10-CM

## 2025-03-27 DIAGNOSIS — M50.220 OTHER CERVICAL DISC DISPLACEMENT, MID-CERVICAL REGION, UNSPECIFIED LEVEL: ICD-10-CM

## 2025-03-27 DIAGNOSIS — M51.379 OTH INTVRT DISC DEGEN, LUMBOSACR W/O LUM BCK OR LW EXTRM PN: ICD-10-CM

## 2025-03-27 DIAGNOSIS — M47.22 OTHER SPONDYLOSIS WITH RADICULOPATHY, CERVICAL REGION: ICD-10-CM

## 2025-03-27 DIAGNOSIS — M54.12 RADICULOPATHY, CERVICAL REGION: ICD-10-CM

## 2025-03-27 DIAGNOSIS — M43.12 SPONDYLOLISTHESIS, CERVICAL REGION: ICD-10-CM

## 2025-03-27 DIAGNOSIS — M47.817 SPONDYLOSIS W/OUT MYELOPATHY OR RADICULOPATHY, LUMBOSACRAL REGION: ICD-10-CM

## 2025-03-27 PROCEDURE — 99214 OFFICE O/P EST MOD 30 MIN: CPT

## 2025-03-27 RX ORDER — LISINOPRIL 2.5 MG/1
2.5 TABLET ORAL
Refills: 0 | Status: ACTIVE | COMMUNITY

## 2025-03-27 RX ORDER — METOPROLOL TARTRATE 25 MG/1
25 TABLET ORAL
Refills: 0 | Status: ACTIVE | COMMUNITY

## 2025-04-11 ENCOUNTER — APPOINTMENT (OUTPATIENT)
Dept: ORTHOPEDIC SURGERY | Facility: CLINIC | Age: 65
End: 2025-04-11

## 2025-04-18 ENCOUNTER — RESULT REVIEW (OUTPATIENT)
Age: 65
End: 2025-04-18

## 2025-04-18 ENCOUNTER — TRANSCRIPTION ENCOUNTER (OUTPATIENT)
Age: 65
End: 2025-04-18

## 2025-05-12 ENCOUNTER — APPOINTMENT (OUTPATIENT)
Dept: OPHTHALMOLOGY | Facility: CLINIC | Age: 65
End: 2025-05-12

## 2025-05-21 ENCOUNTER — APPOINTMENT (OUTPATIENT)
Dept: OPHTHALMOLOGY | Facility: CLINIC | Age: 65
End: 2025-05-21

## 2025-06-12 ENCOUNTER — APPOINTMENT (OUTPATIENT)
Dept: ORTHOPEDIC SURGERY | Facility: CLINIC | Age: 65
End: 2025-06-12
Payer: MEDICARE

## 2025-06-12 PROCEDURE — 20610 DRAIN/INJ JOINT/BURSA W/O US: CPT | Mod: RT

## 2025-06-12 PROCEDURE — 99214 OFFICE O/P EST MOD 30 MIN: CPT | Mod: 25

## 2025-06-26 ENCOUNTER — APPOINTMENT (OUTPATIENT)
Dept: ORTHOPEDIC SURGERY | Facility: CLINIC | Age: 65
End: 2025-06-26
Payer: OTHER MISCELLANEOUS

## 2025-06-26 PROCEDURE — 99214 OFFICE O/P EST MOD 30 MIN: CPT

## 2025-07-03 ENCOUNTER — APPOINTMENT (OUTPATIENT)
Dept: ORTHOPEDIC SURGERY | Facility: CLINIC | Age: 65
End: 2025-07-03

## 2025-09-04 ENCOUNTER — APPOINTMENT (OUTPATIENT)
Dept: ORTHOPEDIC SURGERY | Facility: CLINIC | Age: 65
End: 2025-09-04
Payer: MEDICARE

## 2025-09-04 DIAGNOSIS — M70.62 TROCHANTERIC BURSITIS, LEFT HIP: ICD-10-CM

## 2025-09-04 DIAGNOSIS — M16.11 UNILATERAL PRIMARY OSTEOARTHRITIS, RIGHT HIP: ICD-10-CM

## 2025-09-04 DIAGNOSIS — M70.61 TROCHANTERIC BURSITIS, RIGHT HIP: ICD-10-CM

## 2025-09-04 PROCEDURE — 20610 DRAIN/INJ JOINT/BURSA W/O US: CPT | Mod: LT

## 2025-09-04 PROCEDURE — 99214 OFFICE O/P EST MOD 30 MIN: CPT | Mod: 25

## 2025-09-18 ENCOUNTER — APPOINTMENT (OUTPATIENT)
Dept: ORTHOPEDIC SURGERY | Facility: CLINIC | Age: 65
End: 2025-09-18
Payer: OTHER MISCELLANEOUS

## 2025-09-18 DIAGNOSIS — M48.02 SPINAL STENOSIS, CERVICAL REGION: ICD-10-CM

## 2025-09-18 DIAGNOSIS — M48.061 SPINAL STENOSIS, LUMBAR REGION WITHOUT NEUROGENIC CLAUDICATION: ICD-10-CM

## 2025-09-18 PROCEDURE — 99214 OFFICE O/P EST MOD 30 MIN: CPT
